# Patient Record
Sex: FEMALE | Race: WHITE | Employment: PART TIME | URBAN - METROPOLITAN AREA
[De-identification: names, ages, dates, MRNs, and addresses within clinical notes are randomized per-mention and may not be internally consistent; named-entity substitution may affect disease eponyms.]

---

## 2017-02-22 ENCOUNTER — TRANSFERRED RECORDS (OUTPATIENT)
Dept: HEALTH INFORMATION MANAGEMENT | Facility: CLINIC | Age: 42
End: 2017-02-22

## 2017-02-22 LAB
HPV ABSTRACT: NORMAL
PAP-ABSTRACT: NORMAL

## 2020-05-19 ENCOUNTER — VIRTUAL VISIT (OUTPATIENT)
Dept: FAMILY MEDICINE | Facility: CLINIC | Age: 45
End: 2020-05-19
Payer: COMMERCIAL

## 2020-05-19 DIAGNOSIS — Z76.89 ENCOUNTER TO ESTABLISH CARE: ICD-10-CM

## 2020-05-19 DIAGNOSIS — Z12.31 ENCOUNTER FOR SCREENING MAMMOGRAM FOR BREAST CANCER: Primary | ICD-10-CM

## 2020-05-19 PROCEDURE — 99203 OFFICE O/P NEW LOW 30 MIN: CPT | Mod: GT | Performed by: FAMILY MEDICINE

## 2020-05-19 RX ORDER — CHOLECALCIFEROL (VITAMIN D3) 50 MCG
1 TABLET ORAL DAILY
COMMUNITY

## 2020-05-19 RX ORDER — CHLORAL HYDRATE 500 MG
2 CAPSULE ORAL DAILY
COMMUNITY

## 2020-05-19 RX ORDER — SERTRALINE HYDROCHLORIDE 25 MG/1
25 TABLET, FILM COATED ORAL DAILY
COMMUNITY
End: 2020-07-03

## 2020-05-19 SDOH — HEALTH STABILITY: MENTAL HEALTH: HOW OFTEN DO YOU HAVE A DRINK CONTAINING ALCOHOL?: NEVER

## 2020-05-19 NOTE — Clinical Note
Please abstract the following data from this visit with this patient into the appropriate field in Epic:    Tests that can be patient reported without a hard copy:        Other Tests found in the patient's chart through Chart Review/Care Everywhere:    Pap smear done by this group Dia this date: per care everywhere, had normal pap on 02/22/2017 with negative HPV results    Note to Abstraction: If this section is blank, no results were found via Chart Review/Care Everywhere.

## 2020-05-19 NOTE — PROGRESS NOTES
"Nae Matthews is a 44 year old female who is being evaluated via a billable video visit.      The patient has been notified of following:     \"This video visit will be conducted via a call between you and your physician/provider. We have found that certain health care needs can be provided without the need for an in-person physical exam.  This service lets us provide the care you need with a video conversation.  If a prescription is necessary we can send it directly to your pharmacy.  If lab work is needed we can place an order for that and you can then stop by our lab to have the test done at a later time.    Video visits are billed at different rates depending on your insurance coverage.  Please reach out to your insurance provider with any questions.    If during the course of the call the physician/provider feels a video visit is not appropriate, you will not be charged for this service.\"    Patient has given verbal consent for Video visit? Yes    How would you like to obtain your AVS? Genevieveharboston    Patient would like the video invitation sent by: Send to e-mail at: [email or web address]    Will anyone else be joining your video visit? No      Subjective     Nae Matthews is a 44 year old female who presents today via video visit for the following health issues:    HPI  Concern - Establish care.    Patient is a 43 y/o female with history of depression- in remission being seen today to establish care.   Formerly seen at [facility] but making a change due to new insurance.   She would like to get her routine mammogram done as she is past due.   No acute complaints at this time.          Video Start Time: 11:25am        There is no problem list on file for this patient.    History reviewed. No pertinent surgical history.    Social History     Tobacco Use     Smoking status: Never Smoker     Smokeless tobacco: Never Used   Substance Use Topics     Alcohol use: Never     Frequency: Never     Family History "   Problem Relation Age of Onset     Heart Disease Maternal Grandfather      Suicide Paternal Grandmother      Thyroid Cancer Maternal Aunt 60     Lupus Sister            Reviewed and updated as needed this visit by Provider         Review of Systems   Constitutional, HEENT, cardiovascular, pulmonary, GI, , musculoskeletal, neuro, skin, endocrine and psych systems are negative, except as otherwise noted.      Objective    There were no vitals taken for this visit.  There is no height or weight on file to calculate BMI.  Physical Exam     GENERAL: Healthy, alert and no distress  EYES: Eyes grossly normal to inspection.  No discharge or erythema, or obvious scleral/conjunctival abnormalities.  RESP: No audible wheeze, cough, or visible cyanosis.  No visible retractions or increased work of breathing.    PSYCH: Mentation appears normal, affect normal/bright, judgement and insight intact, normal speech and appearance well-groomed.      Diagnostic Test Results:  Labs reviewed in Epic  none         Assessment & Plan     1. Encounter for screening mammogram for breast cancer  - patient will be scheduled for screening mammogram in the coming days   - *MA Screening Digital Bilateral; Future    2. Encounter to establish care  - will visit clinic in August for routine physical exam          See Patient Instructions    Return in about 3 months (around 8/19/2020) for Physical Exam.    Nadine Vaughan MD  [facility]      Video-Visit Details    Type of service:  Video Visit    Video End Time:11:40am    Originating Location (pt. Location): Home    Distant Location (provider location):  [facility]     Platform used for Video Visit: Doximity    No follow-ups on file.       Nadine Vaughan MD

## 2020-06-03 ENCOUNTER — ANCILLARY PROCEDURE (OUTPATIENT)
Dept: MAMMOGRAPHY | Facility: CLINIC | Age: 45
End: 2020-06-03
Payer: COMMERCIAL

## 2020-06-03 DIAGNOSIS — Z12.31 ENCOUNTER FOR SCREENING MAMMOGRAM FOR BREAST CANCER: ICD-10-CM

## 2020-06-03 PROCEDURE — 77067 SCR MAMMO BI INCL CAD: CPT | Mod: TC

## 2020-06-03 PROCEDURE — 77063 BREAST TOMOSYNTHESIS BI: CPT | Mod: TC

## 2020-07-03 ENCOUNTER — MYC MEDICAL ADVICE (OUTPATIENT)
Dept: FAMILY MEDICINE | Facility: CLINIC | Age: 45
End: 2020-07-03

## 2020-07-03 DIAGNOSIS — F32.0 MILD MAJOR DEPRESSION (H): Primary | ICD-10-CM

## 2020-07-03 RX ORDER — SERTRALINE HYDROCHLORIDE 25 MG/1
25 TABLET, FILM COATED ORAL DAILY
Qty: 90 TABLET | Refills: 0 | Status: SHIPPED | OUTPATIENT
Start: 2020-07-03 | End: 2020-10-09

## 2020-07-03 NOTE — TELEPHONE ENCOUNTER
Routing refill request to provider for review/approval because:  Medication is reported/historical    Pending Prescriptions:                       Disp   Refills    sertraline (ZOLOFT) 25 MG tablet          90 tab*1            Sig: Take 1 tablet (25 mg) by mouth daily    sertraline (ZOLOFT) 50 MG tablet          90 tab*1            Sig: Take 1 tablet (50 mg) by mouth daily      Simi Ma, Registered Nurse   [facility]

## 2020-10-09 ENCOUNTER — OFFICE VISIT (OUTPATIENT)
Dept: FAMILY MEDICINE | Facility: CLINIC | Age: 45
End: 2020-10-09
Payer: COMMERCIAL

## 2020-10-09 VITALS
BODY MASS INDEX: 23.62 KG/M2 | WEIGHT: 165 LBS | SYSTOLIC BLOOD PRESSURE: 100 MMHG | HEIGHT: 70 IN | HEART RATE: 70 BPM | DIASTOLIC BLOOD PRESSURE: 62 MMHG | OXYGEN SATURATION: 97 % | RESPIRATION RATE: 16 BRPM

## 2020-10-09 DIAGNOSIS — Z20.822 ENCOUNTER FOR LABORATORY TESTING FOR COVID-19 VIRUS: ICD-10-CM

## 2020-10-09 DIAGNOSIS — Z00.00 ROUTINE GENERAL MEDICAL EXAMINATION AT A HEALTH CARE FACILITY: Primary | ICD-10-CM

## 2020-10-09 DIAGNOSIS — F32.0 MILD MAJOR DEPRESSION (H): ICD-10-CM

## 2020-10-09 LAB
BASOPHILS # BLD AUTO: 0 10E9/L (ref 0–0.2)
BASOPHILS NFR BLD AUTO: 0.7 %
DIFFERENTIAL METHOD BLD: NORMAL
EOSINOPHIL # BLD AUTO: 0.2 10E9/L (ref 0–0.7)
EOSINOPHIL NFR BLD AUTO: 4.3 %
ERYTHROCYTE [DISTWIDTH] IN BLOOD BY AUTOMATED COUNT: 12.8 % (ref 10–15)
HBA1C MFR BLD: 5.4 % (ref 0–5.6)
HCT VFR BLD AUTO: 36.6 % (ref 35–47)
HGB BLD-MCNC: 12.3 G/DL (ref 11.7–15.7)
LYMPHOCYTES # BLD AUTO: 1.9 10E9/L (ref 0.8–5.3)
LYMPHOCYTES NFR BLD AUTO: 34.5 %
MCH RBC QN AUTO: 28.7 PG (ref 26.5–33)
MCHC RBC AUTO-ENTMCNC: 33.6 G/DL (ref 31.5–36.5)
MCV RBC AUTO: 86 FL (ref 78–100)
MONOCYTES # BLD AUTO: 0.3 10E9/L (ref 0–1.3)
MONOCYTES NFR BLD AUTO: 4.7 %
NEUTROPHILS # BLD AUTO: 3.1 10E9/L (ref 1.6–8.3)
NEUTROPHILS NFR BLD AUTO: 55.8 %
PLATELET # BLD AUTO: 257 10E9/L (ref 150–450)
RBC # BLD AUTO: 4.28 10E12/L (ref 3.8–5.2)
WBC # BLD AUTO: 5.6 10E9/L (ref 4–11)

## 2020-10-09 PROCEDURE — 82306 VITAMIN D 25 HYDROXY: CPT | Performed by: FAMILY MEDICINE

## 2020-10-09 PROCEDURE — 86769 SARS-COV-2 COVID-19 ANTIBODY: CPT | Performed by: FAMILY MEDICINE

## 2020-10-09 PROCEDURE — 85025 COMPLETE CBC W/AUTO DIFF WBC: CPT | Performed by: FAMILY MEDICINE

## 2020-10-09 PROCEDURE — 80061 LIPID PANEL: CPT | Performed by: FAMILY MEDICINE

## 2020-10-09 PROCEDURE — 80048 BASIC METABOLIC PNL TOTAL CA: CPT | Performed by: FAMILY MEDICINE

## 2020-10-09 PROCEDURE — 36415 COLL VENOUS BLD VENIPUNCTURE: CPT | Performed by: FAMILY MEDICINE

## 2020-10-09 PROCEDURE — 99396 PREV VISIT EST AGE 40-64: CPT | Performed by: FAMILY MEDICINE

## 2020-10-09 PROCEDURE — 83036 HEMOGLOBIN GLYCOSYLATED A1C: CPT | Performed by: FAMILY MEDICINE

## 2020-10-09 RX ORDER — SERTRALINE HYDROCHLORIDE 25 MG/1
25 TABLET, FILM COATED ORAL DAILY
Qty: 90 TABLET | Refills: 1 | Status: SHIPPED | OUTPATIENT
Start: 2020-10-09 | End: 2021-03-23

## 2020-10-09 RX ORDER — METHYLPREDNISOLONE 4 MG
500 TABLET, DOSE PACK ORAL
COMMUNITY
Start: 2020-01-07

## 2020-10-09 ASSESSMENT — MIFFLIN-ST. JEOR: SCORE: 1468.69

## 2020-10-09 ASSESSMENT — ENCOUNTER SYMPTOMS
HEMATURIA: 0
EYE PAIN: 0
NERVOUS/ANXIOUS: 1
COUGH: 0
CHILLS: 0
CONSTIPATION: 0
ABDOMINAL PAIN: 0
DIZZINESS: 1
HEMATOCHEZIA: 0
DIARRHEA: 0

## 2020-10-09 ASSESSMENT — PATIENT HEALTH QUESTIONNAIRE - PHQ9: SUM OF ALL RESPONSES TO PHQ QUESTIONS 1-9: 4

## 2020-10-09 NOTE — PROGRESS NOTES
SUBJECTIVE:   CC: Nae Matthews is an 44 year old woman who presents for preventive health visit.       Patient has been advised of split billing requirements and indicates understanding: Yes  Healthy Habits:    Do you get at least three servings of calcium containing foods daily (dairy, green leafy vegetables, etc.)? no, taking calcium and/or vitamin D supplement: yes -     Amount of exercise or daily activities, outside of work: 7 day(s) per week    Problems taking medications regularly No    Medication side effects: No    Have you had an eye exam in the past two years? no    Do you see a dentist twice per year? 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   Admits not wearing it often because it seems to dig into her mouth. Needs a top and bottom mouth guard but it is too expensive to do that at this time   2. Left hip tenderness- usually worse at night. Massage and stretching help.   3. Some concerns about zoloft and dementia risk. Her sister sent her an article on antidepressants and elevated dementia risk.           Today's PHQ-2 Score:   PHQ-2 ( 1999 Pfizer) 10/9/2020 5/19/2020   Q1: Little interest or pleasure in doing things 1 0   Q2: Feeling down, depressed or hopeless 1 1   PHQ-2 Score 2 1   Q1: Little interest or pleasure in doing things Several days Not at all   Q2: Feeling down, depressed or hopeless Several days Several days   PHQ-2 Score 2 1       Abuse: Current or Past(Physical, Sexual or Emotional)- No  Do you feel safe in your environment? Yes        Social History     Tobacco Use     Smoking status: Never Smoker     Smokeless tobacco: Never Used   Substance Use Topics     Alcohol use: Never     Frequency: Never     If you drink alcohol do you typically have >3 drinks per day or >7 drinks per week? No                     Reviewed orders with patient.  Reviewed  "health maintenance and updated orders accordingly - Yes  Labs reviewed in [facility]    Mammogram Screening: Patient under age 50, mutual decision reflected in health maintenance.      Pertinent mammograms are reviewed under the imaging tab.  History of abnormal Pap smear: NO - age 30-65 PAP every 5 years with negative HPV co-testing recommended     Reviewed and updated as needed this visit by clinical staff  Tobacco                Reviewed and updated as needed this visit by Provider                    ROS:  CONSTITUTIONAL: NEGATIVE for fever, chills, change in weight  INTEGUMENTARU/SKIN: NEGATIVE for worrisome rashes, moles or lesions  EYES: NEGATIVE for vision changes or irritation  ENT: NEGATIVE for ear, mouth and throat problems  RESP: NEGATIVE for significant cough or SOB  BREAST: NEGATIVE for masses, tenderness or discharge  CV: NEGATIVE for chest pain, palpitations or peripheral edema  GI: NEGATIVE for nausea, abdominal pain, heartburn, or change in bowel habits  : NEGATIVE for unusual urinary or vaginal symptoms. Periods are regular.  MUSCULOSKELETAL: NEGATIVE for significant arthralgias or myalgia  NEURO: NEGATIVE for weakness, dizziness or paresthesias  PSYCHIATRIC: POSITIVE forHx depression    OBJECTIVE:   /62 (BP Location: Right arm, Patient Position: Chair, Cuff Size: Adult Regular)   Pulse 70   Resp 16   Ht 1.778 m (5' 10\")   Wt 74.8 kg (165 lb)   LMP 09/30/2020   SpO2 97%   Breastfeeding No   BMI 23.68 kg/m    EXAM:  GENERAL: healthy, alert and no distress  EYES: Eyes grossly normal to inspection, PERRL and conjunctivae and sclerae normal  HENT: ear canals and TM's normal, nose and mouth without ulcers or lesions  NECK: no adenopathy, no asymmetry, masses, or scars and thyroid normal to palpation  RESP: lungs clear to auscultation - no rales, rhonchi or wheezes  BREAST: normal without masses, tenderness or nipple discharge and no palpable axillary masses or adenopathy  CV: regular rate and " "rhythm, normal S1 S2, no S3 or S4, no murmur, click or rub, no peripheral edema and peripheral pulses strong  ABDOMEN: soft, nontender,  and bowel sounds normal  MS: no gross musculoskeletal defects noted, no edema  SKIN: no suspicious lesions or rashes  NEURO: Normal strength and tone, mentation intact and speech normal  PSYCH: mentation appears normal, affect normal/bright    Diagnostic Test Results:  Labs reviewed in Epic  none     ASSESSMENT/PLAN:   1. Routine general medical examination at a health care facility  - Lipid panel reflex to direct LDL Fasting  - Hemoglobin A1c  - Basic metabolic panel  (Ca, Cl, CO2, Creat, Gluc, K, Na, BUN)  - CBC with platelets and differential  - Vitamin D Deficiency    2. Mild major depression (H)  - stable. Reviewed article with patient in detail- the reported meds with increased dementia risk are anticholinergics and benzos.   - sertraline (ZOLOFT) 25 MG tablet; Take 1 tablet (25 mg) by mouth daily w/50 mg = 75mg  Dispense: 90 tablet; Refill: 1  - sertraline (ZOLOFT) 50 MG tablet; Take 1 tablet (50 mg) by mouth daily W/ 25 mg = 75 mg daily  Dispense: 90 tablet; Refill: 1    3. Encounter for laboratory testing for COVID-19 virus  - as per patient request   - COVID-19 Virus (Coronavirus) Antibody & Titer Reflex; Future  - COVID-19 Virus (Coronavirus) Antibody & Titer Reflex    Patient has been advised of split billing requirements and indicates understanding: Yes  COUNSELING:   Reviewed preventive health counseling, as reflected in patient instructions       Regular exercise       Healthy diet/nutrition    Estimated body mass index is 23.68 kg/m  as calculated from the following:    Height as of this encounter: 1.778 m (5' 10\").    Weight as of this encounter: 74.8 kg (165 lb).        She reports that she has never smoked. She has never used smokeless tobacco.      Counseling Resources:  ATP IV Guidelines  Pooled Cohorts Equation Calculator  Breast Cancer Risk " Calculator  BRCA-Related Cancer Risk Assessment: FHS-7 Tool  FRAX Risk Assessment  ICSI Preventive Guidelines  Dietary Guidelines for Americans, 2010  Bjond's MyPlate  ASA Prophylaxis  Lung CA Screening    Nadine Vaughan MD  [facility]  Answers for HPI/ROS submitted by the patient on 10/9/2020   Annual Exam:  Frequency of exercise:: 4-5 days/week  Getting at least 3 servings of Calcium per day:: NO  Diet:: Regular (no restrictions)  Taking medications regularly:: Yes  Medication side effects:: None  Bi-annual eye exam:: NO  Dental care twice a year:: NO  Sleep apnea or symptoms of sleep apnea:: None  abdominal pain: No  Blood in stool: No  Blood in urine: No  chest pain: No  chills: No  congestion: No  constipation: No  cough: No  diarrhea: No  dizziness: Yes  ear pain: No  eye pain: No  nervous/anxious: Yes  Additional concerns today:: Yes  Duration of exercise:: 15-30 minutes

## 2020-10-10 LAB
ANION GAP SERPL CALCULATED.3IONS-SCNC: 8 MMOL/L (ref 3–14)
BUN SERPL-MCNC: 13 MG/DL (ref 7–30)
CALCIUM SERPL-MCNC: 9 MG/DL (ref 8.5–10.1)
CHLORIDE SERPL-SCNC: 104 MMOL/L (ref 94–109)
CHOLEST SERPL-MCNC: 184 MG/DL
CO2 SERPL-SCNC: 24 MMOL/L (ref 20–32)
COVID-19 SPIKE RBD ABY TITER: NORMAL
COVID-19 SPIKE RBD ABY: NEGATIVE
CREAT SERPL-MCNC: 0.53 MG/DL (ref 0.52–1.04)
GFR SERPL CREATININE-BSD FRML MDRD: >90 ML/MIN/{1.73_M2}
GLUCOSE SERPL-MCNC: 88 MG/DL (ref 70–99)
HDLC SERPL-MCNC: 46 MG/DL
LDLC SERPL CALC-MCNC: 119 MG/DL
NONHDLC SERPL-MCNC: 138 MG/DL
POTASSIUM SERPL-SCNC: 4 MMOL/L (ref 3.4–5.3)
SODIUM SERPL-SCNC: 136 MMOL/L (ref 133–144)
TRIGL SERPL-MCNC: 97 MG/DL

## 2020-10-11 LAB — DEPRECATED CALCIDIOL+CALCIFEROL SERPL-MC: 32 UG/L (ref 20–75)

## 2020-10-12 PROBLEM — F32.0 MILD MAJOR DEPRESSION (H): Status: ACTIVE | Noted: 2020-10-12

## 2020-11-17 ENCOUNTER — MYC MEDICAL ADVICE (OUTPATIENT)
Dept: FAMILY MEDICINE | Facility: CLINIC | Age: 45
End: 2020-11-17

## 2020-12-15 ENCOUNTER — TELEPHONE (OUTPATIENT)
Dept: FAMILY MEDICINE | Facility: CLINIC | Age: 45
End: 2020-12-15

## 2020-12-15 NOTE — TELEPHONE ENCOUNTER
General Call:     Who is calling:  Nae    Reason for Call:  fatigue    What are your questions or concerns:  It has been one month since my initial covid-19 symptoms. I have struggled with fatigue after getting out of quarantine and would like an exam of my heart muscles and lungs, as I've read that inflammation of those areas is common.  I would be willing to see another doctor in the clinic if Dr. Vaughan does not have availability this month    Date of last appointment with provider: 10/9/2020-Dr Navya Shaw    Okay to leave a detailed message:Yes at Cell number on file:    Telephone Information:   Mobile [phone]

## 2020-12-18 ENCOUNTER — OFFICE VISIT (OUTPATIENT)
Dept: FAMILY MEDICINE | Facility: CLINIC | Age: 45
End: 2020-12-18
Payer: COMMERCIAL

## 2020-12-18 VITALS
WEIGHT: 166 LBS | TEMPERATURE: 97.4 F | BODY MASS INDEX: 23.77 KG/M2 | HEART RATE: 75 BPM | DIASTOLIC BLOOD PRESSURE: 65 MMHG | OXYGEN SATURATION: 98 % | HEIGHT: 70 IN | SYSTOLIC BLOOD PRESSURE: 100 MMHG

## 2020-12-18 DIAGNOSIS — U07.1 INFECTION DUE TO 2019 NOVEL CORONAVIRUS: Primary | ICD-10-CM

## 2020-12-18 LAB
BASOPHILS # BLD AUTO: 0.1 10E9/L (ref 0–0.2)
BASOPHILS NFR BLD AUTO: 1 %
D DIMER PPP FEU-MCNC: <0.3 UG/ML FEU (ref 0–0.5)
DIFFERENTIAL METHOD BLD: NORMAL
EOSINOPHIL # BLD AUTO: 0.3 10E9/L (ref 0–0.7)
EOSINOPHIL NFR BLD AUTO: 4 %
ERYTHROCYTE [DISTWIDTH] IN BLOOD BY AUTOMATED COUNT: 12.6 % (ref 10–15)
HCT VFR BLD AUTO: 37.4 % (ref 35–47)
HGB BLD-MCNC: 12.7 G/DL (ref 11.7–15.7)
LYMPHOCYTES # BLD AUTO: 2.4 10E9/L (ref 0.8–5.3)
LYMPHOCYTES NFR BLD AUTO: 33.9 %
MCH RBC QN AUTO: 28.3 PG (ref 26.5–33)
MCHC RBC AUTO-ENTMCNC: 34 G/DL (ref 31.5–36.5)
MCV RBC AUTO: 84 FL (ref 78–100)
MONOCYTES # BLD AUTO: 0.4 10E9/L (ref 0–1.3)
MONOCYTES NFR BLD AUTO: 5.8 %
NEUTROPHILS # BLD AUTO: 3.9 10E9/L (ref 1.6–8.3)
NEUTROPHILS NFR BLD AUTO: 55.3 %
PLATELET # BLD AUTO: 246 10E9/L (ref 150–450)
RBC # BLD AUTO: 4.48 10E12/L (ref 3.8–5.2)
WBC # BLD AUTO: 7.1 10E9/L (ref 4–11)

## 2020-12-18 PROCEDURE — 36415 COLL VENOUS BLD VENIPUNCTURE: CPT | Performed by: FAMILY MEDICINE

## 2020-12-18 PROCEDURE — 85025 COMPLETE CBC W/AUTO DIFF WBC: CPT | Performed by: FAMILY MEDICINE

## 2020-12-18 PROCEDURE — 85379 FIBRIN DEGRADATION QUANT: CPT | Performed by: FAMILY MEDICINE

## 2020-12-18 PROCEDURE — 99213 OFFICE O/P EST LOW 20 MIN: CPT | Performed by: FAMILY MEDICINE

## 2020-12-18 ASSESSMENT — ANXIETY QUESTIONNAIRES
GAD7 TOTAL SCORE: 3
3. WORRYING TOO MUCH ABOUT DIFFERENT THINGS: SEVERAL DAYS
GAD7 TOTAL SCORE: 3
7. FEELING AFRAID AS IF SOMETHING AWFUL MIGHT HAPPEN: SEVERAL DAYS
4. TROUBLE RELAXING: NOT AT ALL
6. BECOMING EASILY ANNOYED OR IRRITABLE: NOT AT ALL
1. FEELING NERVOUS, ANXIOUS, OR ON EDGE: NOT AT ALL
GAD7 TOTAL SCORE: 3
7. FEELING AFRAID AS IF SOMETHING AWFUL MIGHT HAPPEN: SEVERAL DAYS
5. BEING SO RESTLESS THAT IT IS HARD TO SIT STILL: NOT AT ALL
2. NOT BEING ABLE TO STOP OR CONTROL WORRYING: SEVERAL DAYS

## 2020-12-18 ASSESSMENT — PATIENT HEALTH QUESTIONNAIRE - PHQ9
SUM OF ALL RESPONSES TO PHQ QUESTIONS 1-9: 6
SUM OF ALL RESPONSES TO PHQ QUESTIONS 1-9: 6
10. IF YOU CHECKED OFF ANY PROBLEMS, HOW DIFFICULT HAVE THESE PROBLEMS MADE IT FOR YOU TO DO YOUR WORK, TAKE CARE OF THINGS AT HOME, OR GET ALONG WITH OTHER PEOPLE: SOMEWHAT DIFFICULT

## 2020-12-18 ASSESSMENT — MIFFLIN-ST. JEOR: SCORE: 1468.22

## 2020-12-18 NOTE — PATIENT INSTRUCTIONS
Patient Education     Understanding COVID-19 (Coronavirus Disease 2019)  COVID-19 is an illness of the lungs. It causes fever, coughing and trouble breathing. The illness is caused by a new virus in the coronavirus family called SARS-CoV-2.  First seen in late 2019, this virus has spread to many cities and countries around the world. Scientists are working to understand it better.      To help prevent spreading the infection, wash your hands often, or use an alcohol-based hand .   For the latest information, visit the CDC website at www.cdc.gov/coronavirus/2019-ncov. Or call 757-NXG-WZAM ([phone]).   How does COVID-19 spread?  The virus seems to spread and infect people fairly easily. It may spread by:    Breathing in droplets of fluid that someone coughs or sneezes into the air.    Touching your eyes, nose or mouth after touching an infected surface. (The virus can live on handles, objects, and other surfaces.)  What are the symptoms of COVID-19?  Some people have no symptoms or only mild symptoms. Symptoms can vary from person to person. As experts learn more about COVID-19, new symptoms are being reported.  Symptoms may appear 2 to 14 days after contact with the virus. They include:    Fever or chills    Coughing    Trouble breathing or feeling short of breath    Sore throat    Stuffy or runny nose    Headache and body aches    Fatigue (feeling very tired)    Nausea or vomiting (feeling sick to your stomach or throwing up)    Diarrhea (loose, watery poop)    Abdominal (belly) pain    Loss of smell or taste  You can check your symptoms with the CDC s Coronavirus Self-.   What are possible problems from COVID-19?  In many cases, this virus can cause infection (pneumonia) in both lungs. This can make a person very ill, and it can even cause death.  Your chances of severe illness are higher if:    You re an older adult    You have a serious health issue, such as heart or lung disease, diabetes  or kidney disease    You have a health problem (or take a medicine) that suppresses the immune system  Rarely, some children develop a severe problem called MIS-C. This is similar to [place] disease, which causes blood vessels and body organs to be inflamed. We don t yet know if MIS-C happens only in children, or if adults are also at risk. We also don t know if it's related to COVID-19--many children with MIS-C test positive for the virus, but not all.  Experts continue to study MIS-C. The [facility] has asked hospitals to report any person under 21 who is ill enough to be in the hospital and has all of the following:    A fever over 100.4 F (38.0 C) for more than 24 hours and either a positive COVID-19 test or exposure to the virus in the last 4 weeks    Inflammation in at least 2 organs such as the heart, lungs or kidneys, with lab tests that show inflammation    No other diagnoses besides COVID-19 explain the child's symptoms  How is COVID-19 diagnosed?  Your care team will ask about your symptoms, recent travel and contact with sick people.  Not everyone will be tested for the virus. If you need to be tested, you will have a viral test. This test shows if you have a current COVID-19 infection. Testers may use a cotton swab to take a sample of cells from your nose and throat, or they may take a sample of your saliva (spit).  Viral tests vary from place to place. Some tests can be done at home, and then you need to send them to a lab to be checked. Others are done at testing sites, with some results coming back in about an hour, while others take several days (because they must be sent to a lab).  You may have other tests as well, such as:    Antibody (blood test).  This test may show if you ve had the virus in the past--it won t tell us if you have it right now. (It takes a few weeks for the antibodies to show up in your blood). If you ve had the virus, you may have immunity (protection from the virus) in the future.  We don t know yet how long you would be immune. Antibody tests aren t always accurate.    Sputum test (we may collect mucus that you have coughed up from your lungs).    Chest X-ray or CT scan.  Note about immunity  We don t yet know if people can catch COVID-19 more than once. If a person who has fully recovered is re-tested within 3 months, the test may still show low levels of the virus in their body. (In other words, the test may show that they still have COVID-19, even though they aren t spreading it to others.)  This doesn't mean they can't catch COVID-19 again. They may be protected from the virus for a time, but we don t know how long immunity might last.  How is COVID-19 treated?  At this time, there is no medicine to prevent or treat COVID-19.  So far, the most proven treatment is to support your body while it fights the virus.    Getting extra rest.    Drink extra fluids (6 to 8 glasses of liquids each day), unless a doctor has told you not to. Ask your care team which fluids are best for you. Avoid fluids that contain caffeine or alcohol.    Take over-the-counter (OTC) pain medicine to reduce pain and fever. Your care team will tell you which medicine to use.  If you are very sick, you may need to stay in the hospital.    We may give you fluids through a vein to keep you hydrated (IV fluids).    To make sure your body gets enough oxygen, we may give you an oxygen mask or a breathing machine (ventilator).    We may turn you onto your stomach (called  prone positioning ). This will increase the oxygen in your lungs.    We may give you a medicine through a vein (IV medicine) called remdesivir. It works by stopping the spread of the virus in the body. It's FDA-approved for people being treated in the hospital. This medicine is for those 12 years and older who weigh more than about 88 pounds (40 kgs). In certain cases, it may also be used for people younger than 12 years or who weigh less than about 88 pounds  "(40 kgs).  Those who have recovered from COVID-19 may be asked to donate plasma. (This is called COVID-19 convalescent plasma donation.) The plasma may contain antibodies to help fight the virus in others. Experts don't know if the plasma will work well as a treatment. Research continues, and the FDA has approved it for emergency use in certain people with serious or life-threatening COVID-19. For more information, talk to your care team.  Are you at risk for COVID-19?  Some studies suggest that people without symptoms may spread COVID-19.  You re at risk for getting COVID-19 if:    You live in (or recently traveled to) an area with a COVID-19 outbreak.    You had close contact with someone who has or may have COVID-19. Close contact means being within 6 feet for a total of 15 minutes or more. This includes several short contacts that add up to at least 15 minutes over a 24-hour period.  Date last modified: 11/23/2020  [employer] last reviewed this educational content on 1/1/2020  This information has been modified by your health care provider with permission from the publisher.    0931-1777 [employer]. [address] [place], [place] [id number]. All rights reserved. This information is not intended as a substitute for professional medical care. Always follow your healthcare professional's instructions.           Patient Education   Coping with Life after COVID-19  Being in the hospital because of COVID-19 is scary. Going home can be scary, too. You may face changes to your life, the way you work or what you can eat.   It's hard to adjust to change, and it's normal to feel afraid, frustrated or even angry. These feelings usually go away over time. If your feelings don't start to get better, it's called \"adjustment disorder.\"   What signs should I look out for?  Adjustment disorder can happen to anyone in a time of stress. It makes it hard to cope with daily life. You may feel lonely or fight with loved " "ones, even if you're glad to be home.   Watch for these signs:    Fear or worry    Hard time focusing    Sadness or anger    Trouble talking to family or friends    Feeling like you don't fit in or isolating yourself    Problems with sleep    Drinking alcohol or taking drugs to cope  What can I do?  You can help yourself get better. Feeling you have control helps you move forward. You may wonder if you'll be able to do things you did before. Be patient. Do your best to make the most of every day. Try to build relationships, be as active as you can, eat right and keep a sense of humor. Avoid smoking and drinking too much alcohol.   Call your family doctor or clinic if you're not sure what to do. They can guide you to care or other services.  When should I get help?  Think about getting counseling if your sadness or frustration gets worse. Together with a trained counselor, you can talk about your problems adjusting to life after your hospital stay. You can come up with new ways to handle changes that give you more control.   Get help if you're thinking about hurting yourself. If you need help right away, call 911 or go to the nearest emergency room. You can also try the Crisis Text Line.  Crisis Text Line: 849-204 (http://www.crisistextline.org)  The Crisis Text Line serves anyone, in any crisis. It gives free, 24/7 support. Here's how it works:  1. Text HOME to 035133 from anywhere in the [place], anytime, about any type of crisis.  2. A live, trained Crisis Counselor will text you back quickly.  3. The volunteer Crisis Counselor can help you move from a \"hot moment\" to a \"cool moment.\" They can also help you work out a safety plan.  For informational purposes only. Not to replace the advice of your health care provider. Copyright   2020 [employer]. All rights reserved. Clinically reviewed by the Ambulatory COVID-19 Care Map Team. [employer] [id number] - 04/20.       "

## 2020-12-18 NOTE — PROGRESS NOTES
Subjective     Nae Matthews is a 45 year old female who presents to clinic today for the following health issues:    History of Present Illness       Vascular Disease:  She presents for follow up of vascular disease.  She never takes nitroglycerin. She is not taking daily aspirin.    She eats 2-3 servings of fruits and vegetables daily.She consumes 0 sweetened beverage(s) daily.She exercises with enough effort to increase her heart rate 10 to 19 minutes per day.  She exercises with enough effort to increase her heart rate 3 or less days per week.   She is taking medications regularly.          Answers for HPI/ROS submitted by the patient on 12/18/2020   Chronic problems general questions HPI Form  If you checked off any problems, how difficult have these problems made it for you to do your work, take care of things at home, or get along with other people?: Somewhat difficult  PHQ9 TOTAL SCORE: 6  MELISA 7 TOTAL SCORE: 3      Concern for COVID-19  About how many days ago did these symptoms start? 11/15/20  Is this your first visit for this illness? Yes  In the 14 days before your symptoms started, have you had close contact with someone with COVID-19 (Coronavirus)? Yes, I have been in contact with someone who has COVID-19/Coronavirus (confirmed by lab test).  Do you have a fever or chills? No  Are you having new or worsening difficulty breathing? Yes   Please describe what kind of difficulty you are having breathing:No dyspnea, or dyspnea at patients normal baseline  Do you have new or worsening cough? Yes, it's a dry cough.   Have you had any new or unexplained body aches? No    Have you experienced any of the following NEW symptoms?    Headache: YES    Sore throat: No    Loss of taste or smell: YES    Chest pain: YES    Diarrhea: YES    Rash: YES and under armpit - belives it was from new deodorant  What treatments have you tried? Essential Oils, delsym, suzette seltzer cold medications  Who do you live with?  "  Are you, or a household member, a healthcare worker or a ? YES  Do you live in a nursing home, group home, or shelter? No  Do you have a way to get food/medications if quarantined? Yes, I have a friend or family member who can help me.        patient here today because she is worried about potential complications post COVID. She has been doing a lot of reading and notes latinos/black could have more complications. She is also worried about heart and lung issues. 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   Reports she is still struggling with fatigue and some cough.   taste- 80 % smell- 50%      Review of Systems   Constitutional, HEENT, cardiovascular, pulmonary, GI, , musculoskeletal, neuro, skin, endocrine and psych systems are negative, except as otherwise noted.      Objective    /65 (BP Location: Right arm, Patient Position: Sitting, Cuff Size: Adult Regular)   Pulse 75   Temp 97.4  F (36.3  C) (Oral)   Ht 1.778 m (5' 10\")   Wt 75.3 kg (166 lb)   SpO2 98%   BMI 23.82 kg/m    Body mass index is 23.82 kg/m .  Physical Exam   GENERAL: healthy, alert and no distress  RESP: lungs clear to auscultation - no rales, rhonchi or wheezes  CV: regular rate and rhythm, normal S1 S2, no S3 or S4, no murmur, click or rub, no peripheral edema and peripheral pulses strong  ABDOMEN: soft, nontender, no hepatosplenomegaly, no masses and bowel sounds normal  MS: no gross musculoskeletal defects noted, no edema  PSYCH: mentation appears normal and anxious            Assessment & Plan     Infection due to 2019 novel coronavirus  - resolved. Patient very worried about pulmonary and cardiac complications post COVID. With milder cases the expected complications are less.   - D dimer quantitative  - CBC with platelets and differential        See Patient Instructions    Return if symptoms worsen or fail to improve.    Nadine Vaughan, " MD  [facility]

## 2020-12-19 ASSESSMENT — PATIENT HEALTH QUESTIONNAIRE - PHQ9: SUM OF ALL RESPONSES TO PHQ QUESTIONS 1-9: 6

## 2020-12-19 ASSESSMENT — ANXIETY QUESTIONNAIRES: GAD7 TOTAL SCORE: 3

## 2021-01-20 ENCOUNTER — MYC MEDICAL ADVICE (OUTPATIENT)
Dept: FAMILY MEDICINE | Facility: CLINIC | Age: 46
End: 2021-01-20

## 2021-01-20 DIAGNOSIS — U07.1 INFECTION DUE TO 2019 NOVEL CORONAVIRUS: Primary | ICD-10-CM

## 2021-01-20 NOTE — TELEPHONE ENCOUNTER
Please review and advise on Home Inventory S[pecialists message.     Would you like to do a visit with this patient?     Nae Abel RN Flex

## 2021-01-21 NOTE — TELEPHONE ENCOUNTER
Please let patient know dyspnea following COVID is not unusual.   Will refer to pulmonary rehab to help improve her lung capacity.   Order has been placed but give her contact info.     KYLIE

## 2021-01-21 NOTE — TELEPHONE ENCOUNTER
Patient Contact    Attempt # 1    Was call answered?  No.  Unable to leave message. Mailbox is full.     Information sent via Friendfer.     Nae Abel RN Flex

## 2021-01-22 ENCOUNTER — MYC MEDICAL ADVICE (OUTPATIENT)
Dept: FAMILY MEDICINE | Facility: CLINIC | Age: 46
End: 2021-01-22

## 2021-01-22 NOTE — TELEPHONE ENCOUNTER
Please review and advise on mychart message.   Patient has questions on Pulmonary rehab.     Do you want to have her come in and do visit/xray?    Nae Abel RN Flex

## 2021-01-25 NOTE — TELEPHONE ENCOUNTER
Tried calling patient to schedule appointment, voicemail box full, unable to leave a message. If pt calls please schedule a F2F with Dr. Flores. Ruth Behrens.

## 2021-01-26 NOTE — TELEPHONE ENCOUNTER
My-chart message sent to patient    Cate Granado/[facility]  Leonidas---[facility]

## 2021-03-23 ENCOUNTER — VIRTUAL VISIT (OUTPATIENT)
Dept: INTERNAL MEDICINE | Facility: CLINIC | Age: 46
End: 2021-03-23
Payer: COMMERCIAL

## 2021-03-23 VITALS — HEIGHT: 70 IN | WEIGHT: 165 LBS | BODY MASS INDEX: 23.62 KG/M2

## 2021-03-23 DIAGNOSIS — F32.0 MILD MAJOR DEPRESSION (H): ICD-10-CM

## 2021-03-23 DIAGNOSIS — Z86.16 HISTORY OF 2019 NOVEL CORONAVIRUS DISEASE (COVID-19): Primary | ICD-10-CM

## 2021-03-23 PROCEDURE — 99213 OFFICE O/P EST LOW 20 MIN: CPT | Mod: GT | Performed by: INTERNAL MEDICINE

## 2021-03-23 RX ORDER — SERTRALINE HYDROCHLORIDE 25 MG/1
25 TABLET, FILM COATED ORAL DAILY
Qty: 90 TABLET | Refills: 1 | Status: SHIPPED | OUTPATIENT
Start: 2021-03-23 | End: 2021-09-17

## 2021-03-23 ASSESSMENT — MIFFLIN-ST. JEOR: SCORE: 1463.69

## 2021-03-23 NOTE — PROGRESS NOTES
Nae is a 45 year old who is being evaluated via a billable video visit.      How would you like to obtain your AVS? MyChart  If the video visit is dropped, the invitation should be resent by:Wants to do visit on computer. Text to cell phone: [phone]  Will anyone else be joining your video visit? No      Video Start Time: 10:45 AM    Assessment & Plan     History of 2019 novel coronavirus disease (COVID-19)  I think she is progressing normally but will check cxr. If normal will follow clinically. As long as she is improving there is not much we can do. Did recommend thaqt she walk uninterupted (leave the dog at home) for 40 minutes 3 times a week to build stamina.   - XR Chest 2 Views; Future    Mild major depression (H)  under good control Continue current medications.   - sertraline (ZOLOFT) 25 MG tablet; Take 1 tablet (25 mg) by mouth daily w/50 mg = 75mg  - sertraline (ZOLOFT) 50 MG tablet; Take 1 tablet (50 mg) by mouth daily W/ 25 mg = 75 mg daily      17 minutes spent on the date of the encounter doing chart review, history and exam, documentation and further activities as noted above         Return if symptoms worsen or fail to improve.    Serena Murphy MD  [facility]   Nae is a 45 year old who presents for the following health issues     HPI     Follow up Covid. Refills.    She had a Covid infection in November. Was not hospitalized she did experience significant fatigue and dyspnea on exertion and addition to cough etc. Her symptoms have resolved except for dyspnea on exertion. 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 She is playing for long strecthes now but she knows her wind is not what it was. She walks the dog every day for up to 30 minutes but it is the typical sniff and pee, sniff and pee routine without sustained aerobic exertion.     Appetite is good and weight is stable. No cough or GI  symptoms. She did loose her sense of taste and smell. The taste is back but she can tell her smell is still not right although better. Denies chest pain or chest heaviness.     She has a history of depression but says that has not been a problem. Her mood is good. She denies side effects on the Zoloft and needs a refill.     Review of Systems   Constitutional, HEENT, cardiovascular, pulmonary, GI, , musculoskeletal, neuro, skin, endocrine and psych systems are negative, except as otherwise noted.      Objective         Vitals:  No vitals were obtained today due to virtual visit.    Physical Exam   GENERAL: Healthy, alert and no distress  EYES: Eyes grossly normal to inspection.  No discharge or erythema, or obvious scleral/conjunctival abnormalities.  RESP: No audible wheeze, cough, or visible cyanosis.  No visible retractions or increased work of breathing.    SKIN: Visible skin clear. No significant rash, abnormal pigmentation or lesions.  NEURO: Cranial nerves grossly intact.  Mentation and speech appropriate for age.  PSYCH: Mentation appears normal, affect normal/bright, judgement and insight intact, normal speech and appearance well-groomed.            Video-Visit Details    Type of service:  Video Visit    Video End Time:11:00 AM    Originating Location (pt. Location): Home    Distant Location (provider location):  [facility]     Platform used for Video Visit: ChloeHaztucesta

## 2021-03-26 ENCOUNTER — HOSPITAL ENCOUNTER (OUTPATIENT)
Dept: GENERAL RADIOLOGY | Facility: CLINIC | Age: 46
Discharge: HOME | End: 2021-03-26
Attending: INTERNAL MEDICINE | Admitting: INTERNAL MEDICINE
Payer: COMMERCIAL

## 2021-03-26 DIAGNOSIS — Z86.16 HISTORY OF 2019 NOVEL CORONAVIRUS DISEASE (COVID-19): ICD-10-CM

## 2021-03-26 PROCEDURE — 71046 X-RAY EXAM CHEST 2 VIEWS: CPT

## 2021-08-15 ENCOUNTER — HEALTH MAINTENANCE LETTER (OUTPATIENT)
Age: 46
End: 2021-08-15

## 2021-09-16 DIAGNOSIS — F32.0 MILD MAJOR DEPRESSION (H): ICD-10-CM

## 2021-09-17 RX ORDER — SERTRALINE HYDROCHLORIDE 25 MG/1
TABLET, FILM COATED ORAL
Qty: 90 TABLET | Refills: 0 | Status: SHIPPED | OUTPATIENT
Start: 2021-09-17 | End: 2021-12-20

## 2021-09-17 NOTE — TELEPHONE ENCOUNTER
Medication is being filled for 1 time refill only due to:  Due for 6 month follow up appointment     Please advise patient to schedule appointment before next refill.

## 2021-09-29 NOTE — TELEPHONE ENCOUNTER
Kanwal message sent to patient advising her to schedule appointment.    Keena Ramirez RN  [facility]

## 2021-10-10 ENCOUNTER — HEALTH MAINTENANCE LETTER (OUTPATIENT)
Age: 46
End: 2021-10-10

## 2021-10-19 PROBLEM — F32.9 MAJOR DEPRESSION: Status: ACTIVE | Noted: 2020-10-12

## 2021-12-05 ENCOUNTER — HEALTH MAINTENANCE LETTER (OUTPATIENT)
Age: 46
End: 2021-12-05

## 2021-12-19 DIAGNOSIS — F32.0 MILD MAJOR DEPRESSION (H): ICD-10-CM

## 2021-12-20 RX ORDER — SERTRALINE HYDROCHLORIDE 25 MG/1
TABLET, FILM COATED ORAL
Qty: 90 TABLET | Refills: 0 | Status: SHIPPED | OUTPATIENT
Start: 2021-12-20

## 2021-12-20 NOTE — TELEPHONE ENCOUNTER
Routing refill request to provider for review/approval because:  Patient needs to be seen because:  past due for OV and PHQ was elevated and is not up to date  Kalyn LEIVA RN, BSN

## 2022-09-24 ENCOUNTER — HEALTH MAINTENANCE LETTER (OUTPATIENT)
Age: 47
End: 2022-09-24

## 2023-05-08 ENCOUNTER — HEALTH MAINTENANCE LETTER (OUTPATIENT)
Age: 48
End: 2023-05-08

## 2023-10-08 ENCOUNTER — HEALTH MAINTENANCE LETTER (OUTPATIENT)
Age: 48
End: 2023-10-08